# Patient Record
(demographics unavailable — no encounter records)

---

## 2025-02-12 NOTE — PHYSICAL EXAM
[Appropriately responsive] : appropriately responsive [Alert] : alert [No Acute Distress] : no acute distress [Soft] : soft [Non-tender] : non-tender [Non-distended] : non-distended [Oriented x3] : oriented x3 [Labia Majora] : normal [Labia Minora] : normal [Discharge] : a  ~M vaginal discharge was present [Moderate] : moderate [White] : white [Normal] : normal [Uterine Adnexae] : normal

## 2025-02-12 NOTE — HISTORY OF PRESENT ILLNESS
[No] : Patient does not have concerns regarding sex [Previously active] : previously active [FreeTextEntry1] : 26 yo presents evaluation of vulvovaginal discharge. LMP 3 weeks .  Pt treated with natibiotics for URI.

## 2025-03-19 NOTE — REASON FOR VISIT
[Post Op: _________] : a [unfilled] post op visit [FreeTextEntry1] : Status post robot-assisted laparoscopic appendectomy doing well.  Presents in follow-up today.  She has no pain.

## 2025-03-19 NOTE — PHYSICAL EXAM
[Normal Breath Sounds] : Normal breath sounds [Normal Heart Sounds] : normal heart sounds [de-identified] : PERRL EOMI anicteric sclera pink moist oral mucosa [de-identified] :  no acute distress [de-identified] : Supple [de-identified] :  laparoscopic port sites well-healed.  No obvious signs of infection.

## 2025-03-19 NOTE — PLAN
[FreeTextEntry1] : Status post appendectomy doing well.  Pathology reviewed and benign.  Patient may resume activities today living and full capacity.  May return to work.

## 2025-04-15 NOTE — PLAN
[FreeTextEntry1] : . status post appendectomy doing well the supraumbilical scar recovered.  Patient reassured.  Seems to have an allergic reaction to something.  We reviewed clothing detergents seasonal issues.  I recommended she follow-up with her primary care provider and allergist.  She has verbalized understanding and agrees.  I also recommended using Benadryl counter as needed.

## 2025-04-15 NOTE — PHYSICAL EXAM
[de-identified] : No acute distress [de-identified] : Scars well-healed from laparoscopy. [de-identified] : Subcentimeter raised skin areas red in color in the upper abdomen and upper bilateral chest.

## 2025-04-15 NOTE — REASON FOR VISIT
[Post Op: _________] : a [unfilled] post op visit [FreeTextEntry1] : Status post laparoscopic appendectomy doing well.  Presents today with a pimple which she popped on the supraumbilical scar.  She denies any ongoing pain or discomfort.  She reports some blotches in the's upper abdomen and in the upper chest.  With some itchiness.  Denies any difficulty breathing or shortness of breath.

## 2025-04-15 NOTE — REVIEW OF SYSTEMS
[Itching] : itching [Negative] : Heme/Lymph [FreeTextEntry7] : Status post appendectomy doing well.  Supraumbilical scar with a popped pimple which is better now. [de-identified] : Small blotches in the upper abdomen and upper chest.  With some itchiness.